# Patient Record
Sex: MALE | Race: WHITE | NOT HISPANIC OR LATINO | Employment: FULL TIME | URBAN - METROPOLITAN AREA
[De-identification: names, ages, dates, MRNs, and addresses within clinical notes are randomized per-mention and may not be internally consistent; named-entity substitution may affect disease eponyms.]

---

## 2018-06-28 ENCOUNTER — NON-PROVIDER VISIT (OUTPATIENT)
Dept: OCCUPATIONAL MEDICINE | Facility: CLINIC | Age: 38
End: 2018-06-28

## 2018-06-28 DIAGNOSIS — Z02.1 PRE-EMPLOYMENT DRUG SCREENING: ICD-10-CM

## 2018-06-28 PROCEDURE — 80305 DRUG TEST PRSMV DIR OPT OBS: CPT | Performed by: INTERNAL MEDICINE

## 2018-07-03 LAB
AMP AMPHETAMINE: NORMAL
COC COCAINE: NORMAL
INT CON NEG: NORMAL
INT CON POS: NORMAL
MET METHAMPHETAMINES: NORMAL
OPI OPIATES: NORMAL
PCP PHENCYCLIDINE: NORMAL
POC DRUG COMMENT 753798-OCCUPATIONAL HEALTH: NEGATIVE
THC: NORMAL

## 2021-11-30 NOTE — TELEPHONE ENCOUNTER
Testosterone Refill    Received request via: Pharmacy    Was the patient seen in the last year in this department? Yes 6/23/21 Janki    Does the patient have an active prescription (recently filled or refills available) for medication(s) requested? No

## 2021-12-01 ENCOUNTER — TELEPHONE (OUTPATIENT)
Dept: INTERNAL MEDICINE | Facility: OTHER | Age: 41
End: 2021-12-01

## 2021-12-06 RX ORDER — TESTOSTERONE CYPIONATE 200 MG/ML
INJECTION, SOLUTION INTRAMUSCULAR
Qty: 2 ML | OUTPATIENT
Start: 2021-12-06

## 2021-12-06 NOTE — TELEPHONE ENCOUNTER
Of note, patient was last seen by me in 06/2021 for bronchitis, at that time CSA was initiated. Per  patient has been following with marilyn santiago at [facility] for refills from 6/2021 to 11/2021. If [facility] will be taking over testosterone refill, he needs to be seen and obtain lab.

## 2021-12-28 DIAGNOSIS — R79.89 LOW TESTOSTERONE: ICD-10-CM

## 2021-12-28 NOTE — TELEPHONE ENCOUNTER
"Caller Name: Caleb Benítez  Call Back Number: [phone]    How would the patient prefer to be contacted with a response: Phone call OK to leave a detailed message    medications:pt called and is requesting a refill on his testosterone, he stated he does not have time to do labs right now \"it's nearly impossible, or to make an appointment\". If unable to fill please call patient.  "

## 2021-12-29 RX ORDER — TESTOSTERONE CYPIONATE 200 MG/ML
200 INJECTION, SOLUTION INTRAMUSCULAR
Qty: 2 ML | Refills: 0 | Status: SHIPPED | OUTPATIENT
Start: 2021-12-29 | End: 2022-01-28

## 2021-12-29 RX ORDER — TESTOSTERONE CYPIONATE 200 MG/ML
200 INJECTION, SOLUTION INTRAMUSCULAR
Qty: 2 ML | Refills: 0 | Status: SHIPPED | OUTPATIENT
Start: 2021-12-29 | End: 2021-12-29 | Stop reason: SDUPTHER

## 2021-12-29 NOTE — TELEPHONE ENCOUNTER
Last refill 11/06 by marilyn salcedo  - hx of testosterone in 60s     Please schedule appointment.  - sign CSA agreement on our new system  - consider UDS  - will need get testosterone labs in between the two doses    Refilled 1 month supply

## 2022-03-03 ENCOUNTER — TELEPHONE (OUTPATIENT)
Dept: INTERNAL MEDICINE | Facility: OTHER | Age: 42
End: 2022-03-03
Payer: MEDICAID

## 2022-03-03 NOTE — TELEPHONE ENCOUNTER
VOICEMAIL  1. Caller Name: Caleb Benítez                      Call Back Number: [phone]    2. Message: pt lm requesting a refill on his testosterone for  A 90 day. He just started a new job and cannot take any time off for appointments. Please call patient or send rx    3. Patient approves office to leave a detailed voicemail/MyChart message: yes

## 2022-03-04 NOTE — TELEPHONE ENCOUNTER
Patient should call Shilpi Crowell for refill.     We have not seen or treated him for this condition, we are happy to take over management of this condition however a visit is required.     Please route to attending for review if any issues.    Patient requesting refill of testosterone. I saw patient 06/2021 for acute visit bronchitis. PDMP review shows he was receiving testosterone refill up to 12/2021 by shilpi crowlel.    Review of his record shows he does have hx of testosterone in low 60s.

## 2022-03-04 NOTE — TELEPHONE ENCOUNTER
Who is Shilpi Crowell? It states that you are the patients PCP? Just trying to clarify the information before I call the patient.

## 2022-07-26 ENCOUNTER — OFFICE VISIT (OUTPATIENT)
Dept: INTERNAL MEDICINE | Facility: OTHER | Age: 42
End: 2022-07-26
Payer: COMMERCIAL

## 2022-07-26 VITALS
WEIGHT: 260 LBS | DIASTOLIC BLOOD PRESSURE: 90 MMHG | HEART RATE: 97 BPM | HEIGHT: 72 IN | OXYGEN SATURATION: 90 % | BODY MASS INDEX: 35.21 KG/M2 | SYSTOLIC BLOOD PRESSURE: 141 MMHG | TEMPERATURE: 98 F

## 2022-07-26 DIAGNOSIS — E34.9 HYPOTESTOSTERONEMIA: ICD-10-CM

## 2022-07-26 DIAGNOSIS — I83.93 ASYMPTOMATIC VARICOSE VEINS OF BOTH LOWER EXTREMITIES: ICD-10-CM

## 2022-07-26 DIAGNOSIS — R79.89 LOW TESTOSTERONE IN MALE: ICD-10-CM

## 2022-07-26 PROCEDURE — 99214 OFFICE O/P EST MOD 30 MIN: CPT | Mod: GC

## 2022-07-26 RX ORDER — FLUTICASONE PROPIONATE 50 MCG
SPRAY, SUSPENSION (ML) NASAL
COMMUNITY
Start: 2021-06-23

## 2022-07-26 RX ORDER — GUAIFENESIN 200 MG/1
TABLET ORAL
COMMUNITY
Start: 2021-06-23

## 2022-07-26 RX ORDER — COVID-19 MOLECULAR TEST ASSAY
KIT MISCELLANEOUS
COMMUNITY
Start: 2022-05-21

## 2022-07-26 RX ORDER — TESTOSTERONE CYPIONATE 200 MG/ML
200 INJECTION, SOLUTION INTRAMUSCULAR
Qty: 2 ML | Refills: 0 | Status: SHIPPED | OUTPATIENT
Start: 2022-07-26 | End: 2022-08-10

## 2022-07-26 RX ORDER — TESTOSTERONE CYPIONATE 200 MG/ML
50 INJECTION, SOLUTION INTRAMUSCULAR ONCE
COMMUNITY
End: 2022-07-26 | Stop reason: SDUPTHER

## 2022-07-26 ASSESSMENT — PATIENT HEALTH QUESTIONNAIRE - PHQ9: CLINICAL INTERPRETATION OF PHQ2 SCORE: 0

## 2022-07-26 NOTE — PROGRESS NOTES
Date of Service:   7/26/2022    CC: Testosterone refill    HPI:  Caleb Benítez  is a 42 y.o. male with a PMH of Alcohol dependence (remission), chronic back pain, depression, insomnia and low testosterone. Patient presents and would like a prescription for testosterone. He has been on the prescription for four years. He had a low sex drive at one point and got checked for testosterone levels. He has not had any symptoms or other concerns. No mood changes. He has not had any labs done recently. He never has had elevated blood pressure in the past. He also denies any headaches dizziness or chest pain and palpitations.     Patient smokes one pack of cigarettes per day, and has been doing this for three years. No interest in quitting at this time.     He also has had increased varicose veins for the past six months. These do not hurt. He feels as if his feet do get numb, but does not have a history of diabetes.     Review of systems:  See above    Past Medical History:  Patient Active Problem List    Diagnosis Date Noted   • Low testosterone in male 07/29/2022   • Varicose veins of both lower extremities 07/29/2022   • Sepsis (HCC) 10/27/2014   • Pancreatitis 05/31/2014   • Abdominal pain, other specified site 12/30/2013   • Leukocytosis 12/30/2013   • N&V (nausea and vomiting) 12/30/2013   • Bowel obstruction (HCC) 11/16/2013   • Hallucinations 11/04/2012   • Chronic back pain 11/04/2012   • Narcotic dependence (HCC) 11/04/2012   • Insomnia 11/04/2012   • History of spleen injury 11/04/2012   • Depression 11/04/2012   • EtOH dependence (HCC) 11/03/2012       Past Surgical History:    has a past surgical history that includes splenectomy and inguinal hernia repair.    Medications:  Current Outpatient Medications   Medication Sig Dispense Refill   • ID NOW COVID-19 Kit TEST AS DIRECTED TODAY     • testosterone cypionate (DEPO-TESTOSTERONE) 200 MG/ML Solution injection Inject 1 mL into the shoulder, thigh, or  buttocks every 14 days for 2 doses. Every 2 weeks, into the shoulder, thigh or buttocks. One time for one dose 2 mL 0   • fluticasone (FLONASE) 50 MCG/ACT nasal spray FLUTICASONE PROPIONATE 50 MCG/ACT SUSP (Patient not taking: Reported on 7/26/2022)     • guaifenesin 200 MG tablet GUAIFENESIN 200 MG TABS (Patient not taking: Reported on 7/26/2022)     • fluticasone (FLONASE) 50 MCG/ACT nasal spray FLUTICASONE PROPIONATE 50 MCG/ACT SUSP (Patient not taking: Reported on 7/26/2022)     • guaifenesin 200 MG tablet GUAIFENESIN 200 MG TABS (Patient not taking: Reported on 7/26/2022)       No current facility-administered medications for this visit.       Allergies:  Allergies   Allergen Reactions   • Nkda [No Known Drug Allergy]        Family History:   family history is not on file.     Social History:    Social History     Tobacco Use   • Smoking status: Current Some Day Smoker     Packs/day: 1.00     Types: Cigarettes     Start date: 4/1/2014   • Smokeless tobacco: Never Used   • Tobacco comment: pt states barely one pack   Vaping Use   • Vaping Use: Never used   Substance Use Topics   • Alcohol use: No     Comment: stopped in 2012   • Drug use: No         Physical Exam:  Vitals:    07/26/22 1521   BP: (!) 141/90   Pulse: 97   Temp: 36.7 °C (98 °F)   SpO2: 90%     Body mass index is 35.26 kg/m².  Physical Exam  Constitutional:       Appearance: Normal appearance. He is not toxic-appearing or diaphoretic.   HENT:      Head: Normocephalic and atraumatic.      Nose: Nose normal. No congestion or rhinorrhea.      Mouth/Throat:      Mouth: Mucous membranes are moist.      Pharynx: Oropharynx is clear. No oropharyngeal exudate or posterior oropharyngeal erythema.   Eyes:      General: No scleral icterus.     Extraocular Movements: Extraocular movements intact.      Pupils: Pupils are equal, round, and reactive to light.   Cardiovascular:      Rate and Rhythm: Normal rate and regular rhythm.      Pulses: Normal pulses.       Heart sounds: Normal heart sounds. No murmur heard.    No friction rub. No gallop.   Pulmonary:      Effort: Pulmonary effort is normal. No respiratory distress.      Breath sounds: Normal breath sounds. No wheezing or rales.   Abdominal:      General: Abdomen is flat. Bowel sounds are normal. There is no distension.      Palpations: Abdomen is soft.      Tenderness: There is no abdominal tenderness. There is no guarding.   Musculoskeletal:         General: No swelling.      Cervical back: Normal range of motion. No rigidity.      Right lower leg: No edema.      Left lower leg: No edema.   Lymphadenopathy:      Cervical: No cervical adenopathy.   Skin:     Comments: Varicose veins present   Neurological:      General: No focal deficit present.      Mental Status: He is alert and oriented to person, place, and time.   Psychiatric:         Mood and Affect: Mood normal.         Behavior: Behavior normal.          Labs:  none    Imaging:  none    Assessment/Plan:  Low testosterone in male  Patient has a history of low testosterone in the past. Has been on injectible testosterone for the past four years.     Plan:  -Testosterone levels  -Prescribe six weeks of testosterone  -cbc  -cmp  -controlled substance agreement  -lipid panel  -microalbumin  -follow up in six weeks      Varicose veins of both lower extremities  Patient has varicose veins but no current symptoms. No burning or pain present. Discussed compression stocking use and talked about elective surgery           All imaging results and lab results and consult notes are reviewed at this visit.  Followup: Return in about 6 weeks (around 9/6/2022).    Itzel Bland MD  Internal Medicine PGY-1

## 2022-07-29 PROBLEM — I83.93 VARICOSE VEINS OF BOTH LOWER EXTREMITIES: Status: ACTIVE | Noted: 2022-07-29

## 2022-07-29 PROBLEM — R79.89 LOW TESTOSTERONE IN MALE: Status: ACTIVE | Noted: 2022-07-29

## 2022-07-29 NOTE — ASSESSMENT & PLAN NOTE
Patient has varicose veins but no current symptoms. No burning or pain present. Discussed compression stocking use and talked about elective surgery

## 2022-07-29 NOTE — ASSESSMENT & PLAN NOTE
Patient has a history of low testosterone in the past. Has been on injectible testosterone for the past four years.     Plan:  -Testosterone levels  -Prescribe six weeks of testosterone  -cbc  -cmp  -controlled substance agreement  -lipid panel  -microalbumin  -follow up in six weeks

## 2023-08-21 ENCOUNTER — TELEPHONE (OUTPATIENT)
Dept: INTERNAL MEDICINE | Facility: OTHER | Age: 43
End: 2023-08-21
Payer: COMMERCIAL

## 2023-08-21 NOTE — TELEPHONE ENCOUNTER
Rec'd vm- pt needs refill of the testosterone injection but was told he needs to be seen first. Pt states he does not have insurance and out of pocket pay for a visit here would be $220 and he can't do that. He is wondering what he could do.

## 2023-11-16 NOTE — TELEPHONE ENCOUNTER
Left patient voicemail and asked to call back. Also related this message    Patient may want to be seen at [facility], they have a sliding scale fee related to income. We do not have a sliding scale at our clinic, but we do take many insurances, including some Medicaid plans.    Thank you,    Itzel Bland MD, PGY3

## 2024-11-15 ENCOUNTER — OFFICE VISIT (OUTPATIENT)
Dept: MEDICAL GROUP | Facility: MEDICAL CENTER | Age: 44
End: 2024-11-15
Attending: NURSE PRACTITIONER
Payer: COMMERCIAL

## 2024-11-15 VITALS
BODY MASS INDEX: 41.23 KG/M2 | RESPIRATION RATE: 15 BRPM | HEART RATE: 86 BPM | DIASTOLIC BLOOD PRESSURE: 60 MMHG | OXYGEN SATURATION: 86 % | WEIGHT: 304 LBS | SYSTOLIC BLOOD PRESSURE: 110 MMHG | TEMPERATURE: 96.8 F

## 2024-11-15 DIAGNOSIS — R53.83 OTHER FATIGUE: ICD-10-CM

## 2024-11-15 DIAGNOSIS — Z11.3 ROUTINE SCREENING FOR STI (SEXUALLY TRANSMITTED INFECTION): ICD-10-CM

## 2024-11-15 DIAGNOSIS — R79.89 LOW TESTOSTERONE IN MALE: ICD-10-CM

## 2024-11-15 DIAGNOSIS — E78.5 HYPERLIPIDEMIA, UNSPECIFIED HYPERLIPIDEMIA TYPE: ICD-10-CM

## 2024-11-15 DIAGNOSIS — Z23 NEED FOR VACCINATION: ICD-10-CM

## 2024-11-15 DIAGNOSIS — R73.09 ELEVATED GLUCOSE: ICD-10-CM

## 2024-11-15 PROCEDURE — 90471 IMMUNIZATION ADMIN: CPT

## 2024-11-15 PROCEDURE — 99214 OFFICE O/P EST MOD 30 MIN: CPT | Performed by: NURSE PRACTITIONER

## 2024-11-15 ASSESSMENT — PATIENT HEALTH QUESTIONNAIRE - PHQ9
5. POOR APPETITE OR OVEREATING: 3 - NEARLY EVERY DAY
CLINICAL INTERPRETATION OF PHQ2 SCORE: 3
SUM OF ALL RESPONSES TO PHQ QUESTIONS 1-9: 19

## 2024-11-15 ASSESSMENT — ANXIETY QUESTIONNAIRES
7. FEELING AFRAID AS IF SOMETHING AWFUL MIGHT HAPPEN: NEARLY EVERY DAY
6. BECOMING EASILY ANNOYED OR IRRITABLE: MORE THAN HALF THE DAYS
IF YOU CHECKED OFF ANY PROBLEMS ON THIS QUESTIONNAIRE, HOW DIFFICULT HAVE THESE PROBLEMS MADE IT FOR YOU TO DO YOUR WORK, TAKE CARE OF THINGS AT HOME, OR GET ALONG WITH OTHER PEOPLE: SOMEWHAT DIFFICULT
3. WORRYING TOO MUCH ABOUT DIFFERENT THINGS: NEARLY EVERY DAY
5. BEING SO RESTLESS THAT IT IS HARD TO SIT STILL: NEARLY EVERY DAY
GAD7 TOTAL SCORE: 20
4. TROUBLE RELAXING: NEARLY EVERY DAY
2. NOT BEING ABLE TO STOP OR CONTROL WORRYING: NEARLY EVERY DAY
1. FEELING NERVOUS, ANXIOUS, OR ON EDGE: NEARLY EVERY DAY

## 2024-11-18 NOTE — PROGRESS NOTES
Verbal consent was acquired by the patient to use [employer] ambient listening note generation during this visit     Chief Complaint   Patient presents with    [facility]    Lab Results       Subjective:     HPI:   History of Present Illness  The patient presents to [facility].    He has a history of a back fracture in 2007, which also resulted in a splenic injury requiring surgical removal. He underwent hernia repair in 2021. His blood pressure is typically within normal limits. He is interested in having his cholesterol levels checked.    He has been attending a methadone clinic for the past 4 to 5 years, which he reports as beneficial. He was previously on testosterone therapy but has run out of the medication. He has experienced a weight gain of 70 pounds over the past year.    He has varicose veins in his legs and experiences leg swelling. He suspects he may have neuropathy in his feet, although this has not been formally diagnosed. He reports no snoring or sleep apnea. He reports a lack of energy.    He experienced an illness two weeks ago, which he initially thought was influenza, but now suspects may have been norovirus. He has noticed persistent tonsillar swelling since this illness.    He has never undergone a colonoscopy. He has a history of diarrhea during his childhood and teenage years, which was managed with Imodium. He has been experiencing constipation for the past 1 to 2 years, which he attributes to his pain medications. He also has hemorrhoids.    He has a history of depression and was on antidepressants from the age of 15 or 16 until his mid-20s. He has not seen a therapist or psychiatrist and is not interested in doing so. He is currently dealing with his girlfriend's anxiety and is responsible for managing the household.    SOCIAL HISTORY  He is a current vapor. He used to smoke a pack a day. He stopped drinking in 2012.    FAMILY HISTORY  His mother had diabetes. His sister is  prediabetic. His mother committed suicide. His father  in his sleep from a brain aneurysm. His grandmother tried to commit suicide several times throughout her life. His great grandfather on his mother's side committed suicide.        No problems updated.    ROS  See HPI     Allergies   Allergen Reactions    Nkda [No Known Drug Allergy]        Current medicines (including changes today)  No current outpatient medications on file.     No current facility-administered medications for this visit.       Social History     Tobacco Use    Smoking status: Former     Current packs/day: 1.00     Average packs/day: 1 pack/day for 10.6 years (10.6 ttl pk-yrs)     Types: Cigarettes     Start date: 2014    Smokeless tobacco: Never    Tobacco comments:     pt states barely one pack   Vaping Use    Vaping status: Every Day    Substances: Nicotine    Devices: Disposable   Substance Use Topics    Alcohol use: No     Comment: stopped in     Drug use: No       Patient Active Problem List    Diagnosis Date Noted    Low testosterone in male 2022    Varicose veins of both lower extremities 2022    Sepsis ([facility]) 10/27/2014    Pancreatitis 2014    Abdominal pain, other specified site 2013    Leukocytosis 2013    N&V (nausea and vomiting) 2013    Bowel obstruction (HCC) 2013    Hallucinations 2012    Chronic back pain 2012    Narcotic dependence ([facility]) 2012    Insomnia 2012    History of spleen injury 2012    Depression 2012    EtOH dependence ([facility]) 2012       Family History   Problem Relation Age of Onset    Diabetes Mother     Mental Illness Mother         mom committed suicide    Brain Aneurysm Father     Mental Illness Maternal Grandmother         multiple si attempts          Objective:     /60 (BP Location: Right arm, Patient Position: Sitting, BP Cuff Size: Adult long)   Pulse 86   Temp 36 °C (96.8 °F) (Temporal)   Resp 15   Wt (!)  138 kg (304 lb)   SpO2 (!) 86%  Body mass index is 41.23 kg/m².    Physical Exam:  Physical Exam  Vitals reviewed.   Constitutional:       General: He is awake.      Appearance: Normal appearance. He is well-developed. He is obese.   HENT:      Head: Normocephalic.   Eyes:      Conjunctiva/sclera: Conjunctivae normal.   Cardiovascular:      Rate and Rhythm: Normal rate. Rhythm irregular.   Pulmonary:      Effort: Pulmonary effort is normal. No respiratory distress.      Breath sounds: No wheezing.   Musculoskeletal:      Cervical back: Neck supple.   Skin:     General: Skin is warm and dry.   Neurological:      Mental Status: He is alert and oriented to person, place, and time.   Psychiatric:         Mood and Affect: Mood normal.         Behavior: Behavior normal. Behavior is cooperative.              Assessment and Plan:     The following treatment plan was discussed:    Problem List Items Addressed This Visit       Low testosterone in male    Relevant Orders    Testosterone, Free & Total, Adult Male (w/SHBG)    CBC WITHOUT DIFFERENTIAL    Referral to Urology     Other Visit Diagnoses       Need for vaccination        Relevant Orders    INFLUENZA VACCINE TRI INJ (PF)  (Completed)    Pneumococcal Conjugate Vaccine 20-Valent (6 wks+) (Completed)    Elevated glucose        Relevant Orders    HEMOGLOBIN A1C    Hyperlipidemia, unspecified hyperlipidemia type        Relevant Orders    Lipid Profile    Other fatigue        Relevant Orders    VITAMIN D,25 HYDROXY (DEFICIENCY)    Comp Metabolic Panel    FREE THYROXINE    TSH    VITAMIN B12    VITAMIN C SERUM    Routine screening for STI (sexually transmitted infection)        Relevant Orders    T.PALLIDUM AB DEVIKA (SCREENING)    HEP C VIRUS ANTIBODY    HIV AG/AB COMBO ASSAY SCREENING            Assessment & Plan  1. Establishment of care.  A comprehensive blood work panel will be conducted, including tests for liver and kidney function, testosterone levels, blood counts,  cholesterol, and thyroid function. A referral to Urology will be made for testosterone replacement therapy. The use of MIGUEL hose or compression stockings was suggested to aid in blood flow. He was advised to increase physical activity and exercise to manage neuropathy and help with weight loss that is contributing. A daily stool softener was recommended to alleviate constipation. Various weight loss medications were discussed, and a referral to a bariatric surgeon's office was offered for a nonsurgical weight loss program.    2. High cholesterol.  Blood work will be conducted to check cholesterol levels.    3. Weight gain.  Blood work will be conducted to check for thyroid function and other potential causes. If the patient is diagnosed with diabetes and started on an oral medication, he may qualify for medications like Ozempic or Monjaro. Other weight loss medications such as phentermine and Topamax were discussed. Increasing physical activity and exercise was recommended.    4. Neuropathy.  He was advised to increase physical activity and exercise to help manage neuropathy. Medications to treat pain associated with neuropathy were discussed. Referral to a podiatrist for potential laser therapy was offered.    5. Constipation.  A daily stool softener was recommended to alleviate constipation caused by methadone use.    6. Varicose veins.  The use of MIGUEL hose or compression stockings was suggested to aid in blood flow and prevent the development of new varicose veins. Increasing physical activity and exercise was recommended.    7. Depression.  He reported a history of depression and anxiety. He was not interested in seeing a therapist or psychiatrist at this time.    8. Health Maintenance.  A colonoscopy was discussed but the patient opted to hold off on it for now. The option of a FIT test was also discussed.        Any change or worsening of signs or symptoms, patient encouraged to follow-up or report to emergency  room for further evaluation. Patient verbalizes understanding and agrees.      PLEASE NOTE: This dictation was created using voice recognition software. I have made every reasonable attempt to correct obvious errors, but I expect that there are errors of grammar and possibly content that I did not discover before finalizing the note.